# Patient Record
(demographics unavailable — no encounter records)

---

## 2024-12-30 NOTE — ASSESSMENT
[FreeTextEntry1] : 53 -year-old man with REM behavior disorder is on Klonopin and is currently doing well on Klonopin with no REM behavior disorder episodes.  He is on very low dose klonopin and 5 mg melatonin now.

## 2024-12-30 NOTE — HISTORY OF PRESENT ILLNESS
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  Since then patient is managed with the Klonopin 0.5 mg half tablet every night.  With that his  REM behavior disorder is better controlled with reduction in the amount of episodes, he still gets episodes but rarely.    Patient does not have any side effects with Klonopin and is doing okay on the current dose.   Patient goes to bed at 11 pm and wakes up at 6 am He is now on klonopin 0.25 mg a night.